# Patient Record
(demographics unavailable — no encounter records)

---

## 2025-04-07 NOTE — HISTORY OF PRESENT ILLNESS
[FreeTextEntry1] : 93 yo   retention, long bph hx  finasteride, tamsulosin 0.8 mg (atorvastatin) only taking 0.4 mg since hosp  f/u urine culture not yet treated, with e col 10-49 k   [Urinary Retention] : urinary retention

## 2025-04-07 NOTE — PHYSICAL EXAM
[Chaperone Present] : A chaperone was present in the examining room during all aspects of the physical examination [General Appearance - Well Developed] : well developed [Normal Appearance] : normal appearance [Well Groomed] : well groomed [General Appearance - In No Acute Distress] : no acute distress [Respiration, Rhythm And Depth] : normal respiratory rhythm and effort [Exaggerated Use Of Accessory Muscles For Inspiration] : no accessory muscle use [Affect] : the affect was normal [Mood] : the mood was normal [Not Anxious] : not anxious [de-identified] : no jvd [de-identified] : deferred [de-identified] : michel in place [de-identified] : wheelchair assist [FreeTextEntry2] : [facility] [employer] [facility] [employer]

## 2025-04-07 NOTE — PHYSICAL EXAM
[Chaperone Present] : A chaperone was present in the examining room during all aspects of the physical examination [General Appearance - Well Developed] : well developed [Normal Appearance] : normal appearance [Well Groomed] : well groomed [General Appearance - In No Acute Distress] : no acute distress [Respiration, Rhythm And Depth] : normal respiratory rhythm and effort [Exaggerated Use Of Accessory Muscles For Inspiration] : no accessory muscle use [Affect] : the affect was normal [Mood] : the mood was normal [Not Anxious] : not anxious [de-identified] : no jvd [de-identified] : deferred [de-identified] : michel in place [de-identified] : wheelchair assist [FreeTextEntry2] : [facility] [employer] [facility] [employer]

## 2025-04-07 NOTE — ASSESSMENT
[FreeTextEntry1] : bactrim ds bid x 2 weeks given + culture, not yet treated increase back to baseline 2 tamsulosin daily (0.8 mg), cont finasteride RTC monday 4/7 for fill and pull  risks/benefits reviewed, all options

## 2025-04-07 NOTE — HISTORY OF PRESENT ILLNESS
[FreeTextEntry1] : 91 yo   retention, long bph hx  finasteride, tamsulosin 0.8 mg (atorvastatin) only taking 0.4 mg since hosp  f/u urine culture not yet treated, with e col 10-49 k   [Urinary Retention] : urinary retention